# Patient Record
Sex: FEMALE | Race: WHITE
[De-identification: names, ages, dates, MRNs, and addresses within clinical notes are randomized per-mention and may not be internally consistent; named-entity substitution may affect disease eponyms.]

---

## 2019-01-08 ENCOUNTER — HOSPITAL ENCOUNTER (OUTPATIENT)
Dept: HOSPITAL 92 - TBSIIMAG | Age: 39
Discharge: HOME | End: 2019-01-08
Attending: NEUROLOGICAL SURGERY
Payer: COMMERCIAL

## 2019-01-08 DIAGNOSIS — M54.16: Primary | ICD-10-CM

## 2019-01-08 DIAGNOSIS — Z98.890: ICD-10-CM

## 2019-01-08 PROCEDURE — 72100 X-RAY EXAM L-S SPINE 2/3 VWS: CPT

## 2019-01-08 NOTE — RAD
LUMBAR SPINE:

1/8/19

 

Two views.

 

INDICATION:

Back pain. Right leg pain.

 

COMPARISON:  

10/2/18.

 

Pedicle screws are seen at L5 and S1. 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 Mild anterolist
hesis at this level, unchanged from prior exam. Disc spaces are preserved. The other vertebral bodies
 maintain normal height and alignment. 

 

Facet hypertrophy at L4-5 and L5-S1. 

 

IMPRESSION:  

Postop changes at L5-S1 appears stable from the prior study of 10/2/18.

 

 

 

POS: [facility]

## 2024-09-19 ENCOUNTER — HOSPITAL ENCOUNTER (OUTPATIENT)
Dept: HOSPITAL 92 - SCSRAD | Age: 44
Discharge: HOME | End: 2024-09-19
Payer: COMMERCIAL

## 2024-09-19 DIAGNOSIS — M25.561: Primary | ICD-10-CM
